# Patient Record
Sex: FEMALE | Race: ASIAN | NOT HISPANIC OR LATINO | URBAN - METROPOLITAN AREA
[De-identification: names, ages, dates, MRNs, and addresses within clinical notes are randomized per-mention and may not be internally consistent; named-entity substitution may affect disease eponyms.]

---

## 2019-08-12 ENCOUNTER — EMERGENCY (EMERGENCY)
Facility: HOSPITAL | Age: 24
LOS: 1 days | Discharge: STILL ADMITTED OR ADMINISTRATIVE | End: 2019-08-12
Admitting: EMERGENCY MEDICINE

## 2019-08-12 ENCOUNTER — OUTPATIENT (OUTPATIENT)
Dept: INPATIENT UNIT | Facility: HOSPITAL | Age: 24
LOS: 1 days | Discharge: HOME | End: 2019-08-12

## 2019-08-12 VITALS
HEART RATE: 66 BPM | SYSTOLIC BLOOD PRESSURE: 114 MMHG | RESPIRATION RATE: 16 BRPM | OXYGEN SATURATION: 100 % | DIASTOLIC BLOOD PRESSURE: 74 MMHG | TEMPERATURE: 98 F

## 2019-08-12 VITALS
HEART RATE: 72 BPM | TEMPERATURE: 98 F | DIASTOLIC BLOOD PRESSURE: 56 MMHG | SYSTOLIC BLOOD PRESSURE: 123 MMHG | RESPIRATION RATE: 18 BRPM

## 2019-08-12 DIAGNOSIS — Z98.891 HISTORY OF UTERINE SCAR FROM PREVIOUS SURGERY: Chronic | ICD-10-CM

## 2019-08-12 DIAGNOSIS — Z3A.00 WEEKS OF GESTATION OF PREGNANCY NOT SPECIFIED: ICD-10-CM

## 2019-08-12 DIAGNOSIS — O26.899 OTHER SPECIFIED PREGNANCY RELATED CONDITIONS, UNSPECIFIED TRIMESTER: ICD-10-CM

## 2019-08-12 LAB
APPEARANCE UR: CLEAR — SIGNIFICANT CHANGE UP
BACTERIA # UR AUTO: NEGATIVE — SIGNIFICANT CHANGE UP
BILIRUB UR-MCNC: NEGATIVE — SIGNIFICANT CHANGE UP
BLOOD UR QL VISUAL: SIGNIFICANT CHANGE UP
COLOR SPEC: YELLOW — SIGNIFICANT CHANGE UP
GLUCOSE UR-MCNC: NEGATIVE — SIGNIFICANT CHANGE UP
HYALINE CASTS # UR AUTO: NEGATIVE — SIGNIFICANT CHANGE UP
KETONES UR-MCNC: NEGATIVE — SIGNIFICANT CHANGE UP
LEUKOCYTE ESTERASE UR-ACNC: NEGATIVE — SIGNIFICANT CHANGE UP
NITRITE UR-MCNC: NEGATIVE — SIGNIFICANT CHANGE UP
PH UR: 6 — SIGNIFICANT CHANGE UP (ref 5–8)
PROT UR-MCNC: NEGATIVE — SIGNIFICANT CHANGE UP
RBC CASTS # UR COMP ASSIST: SIGNIFICANT CHANGE UP (ref 0–?)
SP GR SPEC: 1.02 — SIGNIFICANT CHANGE UP (ref 1–1.04)
SQUAMOUS # UR AUTO: SIGNIFICANT CHANGE UP
UROBILINOGEN FLD QL: NORMAL — SIGNIFICANT CHANGE UP
WBC UR QL: SIGNIFICANT CHANGE UP (ref 0–?)

## 2019-08-12 NOTE — OB PROVIDER TRIAGE NOTE - NSHPPHYSICALEXAM_GEN_ALL_CORE
VSS  Abdomen: Soft, non-tender on palpation  SSE: Cervix appears closed  TVUS: Cervical Length 4.00-4.06cm, no funneling, no dynamic changes  TAUS: Fundal placenta, breech presentation, Mvp: 4.54cm,   [place] In progress  UA Pending

## 2019-08-12 NOTE — OB PROVIDER TRIAGE NOTE - ADDITIONAL INSTRUCTIONS
-Follow-up in private OB's office  -Increase PO hydration  -Eat small meals at a time, and do not lay down for 30 min. after eating.  -Tums recommended.

## 2019-08-12 NOTE — ED ADULT TRIAGE NOTE - CHIEF COMPLAINT QUOTE
Patient about 21 weeks pregnant c/o intermittent cramping. Patient denies any vaginal bleeding. LMP 3/16. L&D called, Patient to go to L&D.

## 2019-08-12 NOTE — OB PROVIDER TRIAGE NOTE - HISTORY OF PRESENT ILLNESS
Pt of [facility] 24 y/[facility]  @21.2 weeks gestation presents to triage with c/o abdominal cramping x 1.5 hrs which has now resolved. Pt c/o epigastric pain 10/10. Pt denies LOF,VB. Pt reports positive fetal movement.  Current Ap course uncomplicated   Medical H/x:Denies  Surgical H/x:  x1  Ob/Gyn H/x: 2015/ failure to dilate/ 5lbs+  Psych H/x: Denies  Meds: Pnv  NKDA

## 2019-08-12 NOTE — OB PROVIDER TRIAGE NOTE - NSOBPROVIDERNOTE_OBGYN_ALL_OB_FT
Pt declines medication for epigastric pain at this time. Pt declines medication for epigastric pain at this time.  Dr Mcneil notified of above findings  Pt cleared for discharge by Dr Mcneil  -Follow-up in private OB's office  -Increase PO hydration  -Eat small meals at a time, and do not lay down for 30 min. after eating.  -Tums recommended.   -PTL warning signs reviewed with patient. Patient verbalized understanding.

## 2019-08-12 NOTE — OB PROVIDER TRIAGE NOTE - PLAN OF CARE
Pt cleared for discharge by Dr Mcneil  -Follow-up in private OB's office  -Increase PO hydration  -PTL warning signs reviewed with patient. Patient verbalized understanding. -Eat small meals at a time, and do not lay down for 30 min. after eating.  -Tums recommended.

## 2019-08-12 NOTE — OB PROVIDER TRIAGE NOTE - NSHPLABSRESULTS_GEN_ALL_CORE
UA Pending Urinalysis Basic - ( 12 Aug 2019 06:30 )    Color: YELLOW / Appearance: CLEAR / S.021 / pH: 6.0  Gluc: NEGATIVE / Ketone: NEGATIVE  / Bili: NEGATIVE / Urobili: NORMAL   Blood: SMALL / Protein: NEGATIVE / Nitrite: NEGATIVE   Leuk Esterase: NEGATIVE / RBC: 0-2 / WBC 0-2   Sq Epi: FEW / Non Sq Epi: x / Bacteria: NEGATIVE

## 2019-12-04 ENCOUNTER — APPOINTMENT (OUTPATIENT)
Dept: OBGYN | Facility: CLINIC | Age: 24
End: 2019-12-04
Payer: MEDICAID

## 2019-12-04 ENCOUNTER — ASOB RESULT (OUTPATIENT)
Age: 24
End: 2019-12-04

## 2019-12-04 ENCOUNTER — APPOINTMENT (OUTPATIENT)
Dept: ANTEPARTUM | Facility: CLINIC | Age: 24
End: 2019-12-04
Payer: MEDICAID

## 2019-12-04 VITALS
DIASTOLIC BLOOD PRESSURE: 84 MMHG | SYSTOLIC BLOOD PRESSURE: 121 MMHG | HEART RATE: 116 BPM | BODY MASS INDEX: 36.32 KG/M2 | WEIGHT: 192.38 LBS | HEIGHT: 61 IN

## 2019-12-04 LAB
BILIRUB UR QL STRIP: NORMAL
GLUCOSE UR-MCNC: NEGATIVE
HCG UR QL: 1 EU/DL
HGB UR QL STRIP.AUTO: NORMAL
KETONES UR-MCNC: NORMAL
LEUKOCYTE ESTERASE UR QL STRIP: NEGATIVE
NITRITE UR QL STRIP: POSITIVE
PH UR STRIP: 5.5
PROT UR STRIP-MCNC: NORMAL

## 2019-12-04 PROCEDURE — 76819 FETAL BIOPHYS PROFIL W/O NST: CPT

## 2019-12-04 PROCEDURE — XXXXX: CPT

## 2019-12-04 PROCEDURE — 0502F SUBSEQUENT PRENATAL CARE: CPT

## 2019-12-04 PROCEDURE — 76811 OB US DETAILED SNGL FETUS: CPT

## 2019-12-06 LAB — BACTERIA UR CULT: NORMAL

## 2019-12-09 ENCOUNTER — NON-APPOINTMENT (OUTPATIENT)
Age: 24
End: 2019-12-09

## 2019-12-09 ENCOUNTER — APPOINTMENT (OUTPATIENT)
Dept: OBGYN | Facility: CLINIC | Age: 24
End: 2019-12-09
Payer: MEDICAID

## 2019-12-09 VITALS
DIASTOLIC BLOOD PRESSURE: 81 MMHG | WEIGHT: 196 LBS | HEIGHT: 61 IN | BODY MASS INDEX: 37 KG/M2 | SYSTOLIC BLOOD PRESSURE: 119 MMHG

## 2019-12-09 PROCEDURE — 0502F SUBSEQUENT PRENATAL CARE: CPT

## 2019-12-12 ENCOUNTER — OUTPATIENT (OUTPATIENT)
Dept: OUTPATIENT SERVICES | Facility: HOSPITAL | Age: 24
LOS: 1 days | End: 2019-12-12

## 2019-12-12 VITALS
HEIGHT: 61 IN | DIASTOLIC BLOOD PRESSURE: 70 MMHG | WEIGHT: 195.11 LBS | HEART RATE: 98 BPM | OXYGEN SATURATION: 98 % | TEMPERATURE: 99 F | SYSTOLIC BLOOD PRESSURE: 110 MMHG | RESPIRATION RATE: 16 BRPM

## 2019-12-12 DIAGNOSIS — O34.211 MATERNAL CARE FOR LOW TRANSVERSE SCAR FROM PREVIOUS CESAREAN DELIVERY: ICD-10-CM

## 2019-12-12 DIAGNOSIS — Z98.891 HISTORY OF UTERINE SCAR FROM PREVIOUS SURGERY: Chronic | ICD-10-CM

## 2019-12-12 DIAGNOSIS — Z98.891 HISTORY OF UTERINE SCAR FROM PREVIOUS SURGERY: ICD-10-CM

## 2019-12-12 DIAGNOSIS — Z98.890 OTHER SPECIFIED POSTPROCEDURAL STATES: ICD-10-CM

## 2019-12-12 LAB
APPEARANCE UR: SIGNIFICANT CHANGE UP
BACTERIA # UR AUTO: HIGH
BILIRUB UR-MCNC: SIGNIFICANT CHANGE UP
BLD GP AB SCN SERPL QL: NEGATIVE — SIGNIFICANT CHANGE UP
BLOOD UR QL VISUAL: NEGATIVE — SIGNIFICANT CHANGE UP
COLOR SPEC: YELLOW — SIGNIFICANT CHANGE UP
GLUCOSE UR-MCNC: NEGATIVE — SIGNIFICANT CHANGE UP
HCT VFR BLD CALC: 36.1 % — SIGNIFICANT CHANGE UP (ref 34.5–45)
HGB BLD-MCNC: 11.4 G/DL — LOW (ref 11.5–15.5)
HYALINE CASTS # UR AUTO: HIGH
KETONES UR-MCNC: SIGNIFICANT CHANGE UP
LEUKOCYTE ESTERASE UR-ACNC: SIGNIFICANT CHANGE UP
MCHC RBC-ENTMCNC: 26.3 PG — LOW (ref 27–34)
MCHC RBC-ENTMCNC: 31.6 % — LOW (ref 32–36)
MCV RBC AUTO: 83.4 FL — SIGNIFICANT CHANGE UP (ref 80–100)
NITRITE UR-MCNC: NEGATIVE — SIGNIFICANT CHANGE UP
NRBC # FLD: 0 K/UL — SIGNIFICANT CHANGE UP (ref 0–0)
PH UR: 6 — SIGNIFICANT CHANGE UP (ref 5–8)
PLATELET # BLD AUTO: 303 K/UL — SIGNIFICANT CHANGE UP (ref 150–400)
PMV BLD: 11.9 FL — SIGNIFICANT CHANGE UP (ref 7–13)
PROT UR-MCNC: 70 — SIGNIFICANT CHANGE UP
RBC # BLD: 4.33 M/UL — SIGNIFICANT CHANGE UP (ref 3.8–5.2)
RBC # FLD: 14.6 % — HIGH (ref 10.3–14.5)
RBC CASTS # UR COMP ASSIST: SIGNIFICANT CHANGE UP (ref 0–?)
RH IG SCN BLD-IMP: POSITIVE — SIGNIFICANT CHANGE UP
SP GR SPEC: 1.03 — SIGNIFICANT CHANGE UP (ref 1–1.04)
SQUAMOUS # UR AUTO: SIGNIFICANT CHANGE UP
UROBILINOGEN FLD QL: SIGNIFICANT CHANGE UP
WBC # BLD: 14.22 K/UL — HIGH (ref 3.8–10.5)
WBC # FLD AUTO: 14.22 K/UL — HIGH (ref 3.8–10.5)
WBC UR QL: >50 — HIGH (ref 0–?)

## 2019-12-12 NOTE — OB PST NOTE - NSHPPHYSICALEXAM_GEN_ALL_CORE

## 2019-12-12 NOTE — OB PST NOTE - PMH
History of  section    No pertinent past medical history Anemia    History of  section    History of postpartum depression

## 2019-12-12 NOTE — OB PST NOTE - HISTORY OF PRESENT ILLNESS
23 y/o female   with hx of  section due to failure to progress in labor and fetal decelerations during labor. Pt now with EDC 19, scheduled for Repeat  section 19. 23 y/o female   with hx of  section due to failure to progress in labor and fetal decelerations.  Pt now with EDC 19, scheduled for Repeat  section 19.

## 2019-12-12 NOTE — OB PST NOTE - NSANTHOSAYNRD_GEN_A_CORE
No. PARI screening performed.  STOP BANG Legend: 0-2 = LOW Risk; 3-4 = INTERMEDIATE Risk; 5-8 = HIGH Risk

## 2019-12-12 NOTE — OB PST NOTE - ASSESSMENT
25 y/o female   with hx of  section due to failure to progress in labor and fetal decelerations during labor. Pt now with EDC 19, scheduled for Repeat  section 19.

## 2019-12-12 NOTE — OB PST NOTE - NSHPREVIEWOFSYSTEMS_GEN_ALL_CORE
General: No fever, chills, sweating, anorexia, weight loss or weight gain. No polyphagia, polyurea, polydypsia, malaise, or fatigue    Skin: No rashes, itching, or dryness. No change in size/color of moles. No tumors, brittle nails, pitted nails, or hair loss    Breast: No tenderness, lumps, or nipple discharge      Ophthalmologic: No diplopia, photophobia, lacrimation, blurred Vision , or eye discharge    ENMT Symptoms: No hearing difficulty, ear pain, tinnitus, or vertigo. No sinus symptoms, nasal congestion, nasal   discharge, or nasal obstruction    Respiratory and Thorax: No wheezing, dyspnea, cough, hemoptysis, or pleuritic chest pPain     Cardiovascular: No chest pain, palpitations, dyspnea on exertion, orthopnea, paroxysmal nocturnal dyspnea,   peripheral edema, or claudication    Gastrointestinal: No nausea, vomiting, diarrhea, constipation, change in bowel habits, flatulence, abdominal pain, or melena    Genitourinary/ Pelvis: No hematuria, renal colic, or flank pain.  No urine discoloration, incontinence, dysuria, or urinary hesitancy. Normal urinary frequency. No nocturia, abnormal vaginal bleeding, vaginal discharge, spotting, pelvic pain, or vaginal leakage    Musculoskeletal: No arthralgia, arthritis, joint swelling, muscle cramping, muscle weakness, neck pain, arm pain, or leg pain    Neurological: No transient paralysis, weakness, paresthesias, or seizures. No syncope, tremors, vertigo, loss of sensation, difficulty walking, loss of consciousness, hemiparesis, confusion, or facial palsy    Psychiatric: No suicidal ideation, depression, anxiety, insomnia, memory loss, paranoia, mood swings, agitation, hallucinations, or hyperactivity    Hematology: No gum bleeding, nose bleeding, or skin lumps    Lymphatic: No enlarged or tender lymph nodes. No extremity swelling    Endocrine: No heat or cold intolerance    Immunologic: No recurrent or persistent infections General: No fever, chills, sweating, anorexia, weight loss or weight gain. No polyphagia, polyurea, polydypsia, malaise, or fatigue    Skin: No rashes, itching, or dryness. No change in size/color of moles. No tumors, brittle nails, pitted nails, or hair loss    Breast: No tenderness, lumps, or nipple discharge      Ophthalmologic: No diplopia, photophobia, lacrimation, blurred Vision , or eye discharge    ENMT Symptoms: No hearing difficulty, ear pain, tinnitus, or vertigo. No sinus symptoms, nasal congestion, nasal   discharge, or nasal obstruction    Respiratory and Thorax: No wheezing, dyspnea, cough, hemoptysis, or pleuritic chest pPain     Cardiovascular: No chest pain, palpitations, dyspnea on exertion, orthopnea, paroxysmal nocturnal dyspnea,   peripheral edema, or claudication    Gastrointestinal:  hx of N/V throughout pregnancy.  Pt reports nausea persists, last episode of vomiting was 1 week ago. Also with constipation, last BM today, denies diarrhea, constipation, change in bowel habits, flatulence, abdominal pain, or melena    Genitourinary/ Pelvis: No hematuria, renal colic, or flank pain.  No urine discoloration, incontinence, dysuria, or urinary hesitancy. Normal urinary frequency. No nocturia, abnormal vaginal bleeding, vaginal discharge, spotting, pelvic pain, or vaginal leakage    Musculoskeletal: No arthralgia, arthritis, joint swelling, muscle cramping, muscle weakness, neck pain, arm pain, or leg pain    Neurological: No transient paralysis, weakness, paresthesias, or seizures. No syncope, tremors, vertigo, loss of sensation, difficulty walking, loss of consciousness, hemiparesis, confusion, or facial palsy    Psychiatric: No suicidal ideation, depression, anxiety, insomnia, memory loss, paranoia, mood swings, agitation, hallucinations, or hyperactivity    Hematology: No gum bleeding, nose bleeding, or skin lumps    Lymphatic: No enlarged or tender lymph nodes. No extremity swelling    Endocrine: No heat or cold intolerance    Immunologic: No recurrent or persistent infections General: No fever, chills, sweating, anorexia, weight loss or weight gain. No polyphagia, polyurea, polydypsia, malaise, or fatigue    Skin: No rashes, itching, or dryness. No change in size/color of moles. No tumors, brittle nails, pitted nails, or hair loss    Breast: No tenderness, lumps, or nipple discharge      Ophthalmologic: No diplopia, photophobia, lacrimation, blurred Vision , or eye discharge    ENMT Symptoms: No hearing difficulty, ear pain, tinnitus, or vertigo. No sinus symptoms, nasal congestion, nasal   discharge, or nasal obstruction    Respiratory and Thorax: No wheezing, dyspnea, cough, hemoptysis, or pleuritic chest pPain     Cardiovascular: No chest pain, palpitations, dyspnea on exertion, orthopnea, paroxysmal nocturnal dyspnea,   peripheral edema, or claudication    Gastrointestinal:  hx of N/V throughout pregnancy.  Pt reports nausea persists, last episode of vomiting was 1 week ago. Also with constipation, last BM today, denies diarrhea, constipation, change in bowel habits, flatulence, abdominal pain, or melena    Genitourinary/ Pelvis: No hematuria, renal colic, or flank pain.  No urine discoloration, incontinence, dysuria, or urinary hesitancy. Normal urinary frequency. No nocturia, abnormal vaginal bleeding, vaginal discharge, spotting, pelvic pain, or vaginal leakage    Musculoskeletal: No arthralgia, arthritis, joint swelling, muscle cramping, muscle weakness, neck pain, arm pain, or leg pain    Neurological: No transient paralysis, weakness, paresthesias, or seizures. No syncope, tremors, vertigo, loss of sensation, difficulty walking, loss of consciousness, hemiparesis, confusion, or facial palsy    Psychiatric:  Pt reports hx of postpartum depression, recent feelings of depression, denies suicidal ideation.  Pt take ambiens for sleep.  Denies  memory loss, paranoia, mood swings, agitation, hallucinations, or hyperactivity    Hematology: No gum bleeding, nose bleeding, or skin lumps    Lymphatic: No enlarged or tender lymph nodes. No extremity swelling    Endocrine: No heat or cold intolerance    Immunologic: No recurrent or persistent infections

## 2019-12-13 LAB — T PALLIDUM AB TITR SER: NEGATIVE — SIGNIFICANT CHANGE UP

## 2019-12-16 ENCOUNTER — EMERGENCY (EMERGENCY)
Facility: HOSPITAL | Age: 24
LOS: 1 days | Discharge: STILL ADMITTED OR ADMINISTRATIVE | End: 2019-12-16
Admitting: EMERGENCY MEDICINE

## 2019-12-16 ENCOUNTER — OUTPATIENT (OUTPATIENT)
Dept: INPATIENT UNIT | Facility: HOSPITAL | Age: 24
LOS: 1 days | Discharge: HOME | End: 2019-12-16
Payer: MEDICAID

## 2019-12-16 VITALS
TEMPERATURE: 98 F | HEART RATE: 88 BPM | RESPIRATION RATE: 18 BRPM | DIASTOLIC BLOOD PRESSURE: 73 MMHG | SYSTOLIC BLOOD PRESSURE: 113 MMHG

## 2019-12-16 VITALS — TEMPERATURE: 98 F | SYSTOLIC BLOOD PRESSURE: 106 MMHG | DIASTOLIC BLOOD PRESSURE: 68 MMHG | HEART RATE: 78 BPM

## 2019-12-16 VITALS
OXYGEN SATURATION: 100 % | HEIGHT: 61 IN | RESPIRATION RATE: 17 BRPM | DIASTOLIC BLOOD PRESSURE: 80 MMHG | TEMPERATURE: 97 F | SYSTOLIC BLOOD PRESSURE: 124 MMHG | HEART RATE: 84 BPM

## 2019-12-16 DIAGNOSIS — Z98.891 HISTORY OF UTERINE SCAR FROM PREVIOUS SURGERY: Chronic | ICD-10-CM

## 2019-12-16 DIAGNOSIS — O26.899 OTHER SPECIFIED PREGNANCY RELATED CONDITIONS, UNSPECIFIED TRIMESTER: ICD-10-CM

## 2019-12-16 DIAGNOSIS — Z3A.00 WEEKS OF GESTATION OF PREGNANCY NOT SPECIFIED: ICD-10-CM

## 2019-12-16 PROBLEM — D64.9 ANEMIA, UNSPECIFIED: Chronic | Status: ACTIVE | Noted: 2019-12-12

## 2019-12-16 LAB
ALBUMIN SERPL ELPH-MCNC: 3.2 G/DL — LOW (ref 3.3–5)
ALP SERPL-CCNC: 154 U/L — HIGH (ref 40–120)
ALT FLD-CCNC: 8 U/L — SIGNIFICANT CHANGE UP (ref 4–33)
AMYLASE P1 CFR SERPL: 62 U/L — SIGNIFICANT CHANGE UP (ref 25–125)
ANION GAP SERPL CALC-SCNC: 14 MMO/L — SIGNIFICANT CHANGE UP (ref 7–14)
AST SERPL-CCNC: 23 U/L — SIGNIFICANT CHANGE UP (ref 4–32)
BILIRUB SERPL-MCNC: 0.3 MG/DL — SIGNIFICANT CHANGE UP (ref 0.2–1.2)
BUN SERPL-MCNC: 9 MG/DL — SIGNIFICANT CHANGE UP (ref 7–23)
CALCIUM SERPL-MCNC: 9.1 MG/DL — SIGNIFICANT CHANGE UP (ref 8.4–10.5)
CHLORIDE SERPL-SCNC: 100 MMOL/L — SIGNIFICANT CHANGE UP (ref 98–107)
CO2 SERPL-SCNC: 23 MMOL/L — SIGNIFICANT CHANGE UP (ref 22–31)
CREAT SERPL-MCNC: 0.52 MG/DL — SIGNIFICANT CHANGE UP (ref 0.5–1.3)
GLUCOSE SERPL-MCNC: 99 MG/DL — SIGNIFICANT CHANGE UP (ref 70–99)
HCT VFR BLD CALC: 33.5 % — LOW (ref 34.5–45)
HGB BLD-MCNC: 10.4 G/DL — LOW (ref 11.5–15.5)
LIDOCAIN IGE QN: 10.9 U/L — SIGNIFICANT CHANGE UP (ref 7–60)
MCHC RBC-ENTMCNC: 25.4 PG — LOW (ref 27–34)
MCHC RBC-ENTMCNC: 31 % — LOW (ref 32–36)
MCV RBC AUTO: 81.7 FL — SIGNIFICANT CHANGE UP (ref 80–100)
NRBC # FLD: 0 K/UL — SIGNIFICANT CHANGE UP (ref 0–0)
PLATELET # BLD AUTO: 326 K/UL — SIGNIFICANT CHANGE UP (ref 150–400)
PMV BLD: 12 FL — SIGNIFICANT CHANGE UP (ref 7–13)
POTASSIUM SERPL-MCNC: 4.2 MMOL/L — SIGNIFICANT CHANGE UP (ref 3.5–5.3)
POTASSIUM SERPL-SCNC: 4.2 MMOL/L — SIGNIFICANT CHANGE UP (ref 3.5–5.3)
PROT SERPL-MCNC: 6.8 G/DL — SIGNIFICANT CHANGE UP (ref 6–8.3)
RBC # BLD: 4.1 M/UL — SIGNIFICANT CHANGE UP (ref 3.8–5.2)
RBC # FLD: 14.6 % — HIGH (ref 10.3–14.5)
SODIUM SERPL-SCNC: 137 MMOL/L — SIGNIFICANT CHANGE UP (ref 135–145)
WBC # BLD: 19.09 K/UL — HIGH (ref 3.8–10.5)
WBC # FLD AUTO: 19.09 K/UL — HIGH (ref 3.8–10.5)

## 2019-12-16 PROCEDURE — 59025 FETAL NON-STRESS TEST: CPT | Mod: 26

## 2019-12-16 PROCEDURE — 99203 OFFICE O/P NEW LOW 30 MIN: CPT

## 2019-12-16 RX ORDER — SIMETHICONE 80 MG/1
80 TABLET, CHEWABLE ORAL ONCE
Refills: 0 | Status: COMPLETED | OUTPATIENT
Start: 2019-12-16 | End: 2019-12-16

## 2019-12-16 RX ADMIN — SIMETHICONE 80 MILLIGRAM(S): 80 TABLET, CHEWABLE ORAL at 06:55

## 2019-12-16 NOTE — OB PROVIDER TRIAGE NOTE - HISTORY OF PRESENT ILLNESS
24 yr old  @ 39.2 wk. presents with epigastric pain an hour ago, with single episode of vomitting, Denies VB/LOF/Ctx. Reports positive fetal movement. Patient reports hx of GERD but has not been able to received any medications, unable to use TUMS over the counter.  PNI: GERD, Scheduled  19  Obhx:  H/O  section   section 8/1/15, FT, 5#19 24 yr old  @ 39.2 wk. Presents with recurrent episode of epigastric pain, that started again an hour ago, with single episode of vomiting Denies VB/LOF/Ctx. Reports positive fetal movement. Patient reports hx of GERD but has not been able to received any medications, unable to use TUMS over the counter.  PNI: GERD, Scheduled  19  Obhx:  H/O  section   section 8/1/15, FT, 5#19

## 2019-12-16 NOTE — OB PROVIDER TRIAGE NOTE - NSHPLABSRESULTS_GEN_ALL_CORE
10.4   19.09 )-----------( 326      ( 16 Dec 2019 06:53 )             33.5     12-16    137  |  100  |  9   ----------------------------<  99  4.2   |  23  |  0.52    Ca    9.1      16 Dec 2019 06:53    TPro  6.8  /  Alb  3.2<L>  /  TBili  0.3  /  DBili  x   /  AST  23  /  ALT  8   /  AlkPhos  154<H>  12-16

## 2019-12-16 NOTE — OB PROVIDER TRIAGE NOTE - NSOBPROVIDERNOTE_OBGYN_ALL_OB_FT
24 yr old  @ 39.2 wk. presents with epigastric pain an hour ago, with single episode of vomitting, Denies VB/LOF/Ctx. Reports positive fetal movement. Patient reports hx of GERD but has not been able to received any medications, unable to use TUMS over the counter.  PNI: GERD, Scheduled  19  Obhx:  H/O  section   section 8/1/15, FT, 5#19    VS: 127/73  TOCo: ctx irritability, in progress  NST: , +accels, -decels, moderate variability, in progress  VE: L/C/P    plan:   CBC  CMP  Lipase/Amylase  Simethicone 24 yr old  @ 39.2 wk. presents with epigastric pain an hour ago, with single episode of vomitting, Denies VB/LOF/Ctx. Reports positive fetal movement. Patient reports hx of GERD but has not been able to received any medications, unable to use TUMS over the counter.  PNI: GERD, Scheduled  19  Obhx:  H/O  section   section 8/1/15, FT, 5#19    VS: 127/73  TOCo: ctx irritability, in progress  NST: , +accels, -decels, moderate variability, in progress  VE: L/C/P    plan:   CBC  CMP  Lipase/Amylase  Simethicone    Patient denies pain, discomfort and symptoms mentioned earlier. Patient symptoms most likely associated with GERD.   - discussed with  patient's CBC values  - cleared for discharge to home   - to keep scheduled c/s date 24 yr old  @ 39.2 wk. presents with epigastric pain an hour ago, with single episode of vomitting, Denies VB/LOF/Ctx. Reports positive fetal movement. Patient reports hx of GERD but has not been able to received any medications, unable to use TUMS over the counter.  PNI: GERD, Scheduled  19  Obhx:  H/O  section   section 8/1/15, FT, 5#19    VS: 127/73  TOCo: ctx irritability, in progress  NST: , +accels, -decels, moderate variability, in progress  VE: L/C/P    plan:   CBC  CMP  Lipase/Amylase  Simethicone    Patient denies pain, discomfort and symptoms mentioned earlier. Patient symptoms most likely associated with GERD.   - discussed with  patient's CBC values, educated patient on returning for symptoms associated with infection, patient verbalized understanding.   - cleared for discharge to home   - to keep scheduled c/s date

## 2019-12-16 NOTE — OB PROVIDER TRIAGE NOTE - FINDINGS/TREATMENT
Patient denies pain, discomfort and symptoms mentioned earlier. Patient symptoms most likely associated with GERD.   - discussed with  patient's CBC values  - cleared for discharge to home   - to keep scheduled c/s date

## 2019-12-16 NOTE — ED ADULT TRIAGE NOTE - CHIEF COMPLAINT QUOTE
Patient about 39 weeks pregnant c/o sharp cramping. Patient reports scheduled  . Patient denies any water breaking. [facility]  L&D called, Patient to go to L&D.

## 2019-12-16 NOTE — OB PROVIDER TRIAGE NOTE - NSHPPHYSICALEXAM_GEN_ALL_CORE
VS: 127/73  TOCo: ctx irritability, in progress  NST: , +accels, -decels, moderate variability, in progresss  VE: L/C/P VS: 127/73  Abd: soft non-tender. McMurphy's sign negative  TOCo: ctx irritability, in progress  NST: , +accels, -decels, moderate variability, in progress  VE: L/C/P

## 2019-12-17 ENCOUNTER — APPOINTMENT (OUTPATIENT)
Dept: OBGYN | Facility: CLINIC | Age: 24
End: 2019-12-17
Payer: MEDICAID

## 2019-12-17 ENCOUNTER — NON-APPOINTMENT (OUTPATIENT)
Age: 24
End: 2019-12-17

## 2019-12-17 VITALS
SYSTOLIC BLOOD PRESSURE: 123 MMHG | WEIGHT: 201 LBS | DIASTOLIC BLOOD PRESSURE: 84 MMHG | HEIGHT: 61 IN | BODY MASS INDEX: 37.95 KG/M2

## 2019-12-17 DIAGNOSIS — L29.8 OTHER PRURITUS: ICD-10-CM

## 2019-12-17 PROCEDURE — 0502F SUBSEQUENT PRENATAL CARE: CPT

## 2019-12-18 ENCOUNTER — TRANSCRIPTION ENCOUNTER (OUTPATIENT)
Age: 24
End: 2019-12-18

## 2019-12-18 RX ORDER — SODIUM CHLORIDE 9 MG/ML
1000 INJECTION, SOLUTION INTRAVENOUS
Refills: 0 | Status: DISCONTINUED | OUTPATIENT
Start: 2019-12-19 | End: 2019-12-20

## 2019-12-19 ENCOUNTER — TRANSCRIPTION ENCOUNTER (OUTPATIENT)
Age: 24
End: 2019-12-19

## 2019-12-19 ENCOUNTER — APPOINTMENT (OUTPATIENT)
Dept: OBGYN | Facility: HOSPITAL | Age: 24
End: 2019-12-19

## 2019-12-19 ENCOUNTER — INPATIENT (INPATIENT)
Facility: HOSPITAL | Age: 24
LOS: 2 days | Discharge: HOME | End: 2019-12-22
Attending: OBSTETRICS & GYNECOLOGY | Admitting: OBSTETRICS & GYNECOLOGY
Payer: MEDICAID

## 2019-12-19 VITALS — TEMPERATURE: 98 F | RESPIRATION RATE: 14 BRPM

## 2019-12-19 DIAGNOSIS — Z98.891 HISTORY OF UTERINE SCAR FROM PREVIOUS SURGERY: Chronic | ICD-10-CM

## 2019-12-19 DIAGNOSIS — O34.21 MATERNAL CARE FOR SCAR FROM PREVIOUS CESAREAN DELIVERY: ICD-10-CM

## 2019-12-19 DIAGNOSIS — Z98.890 OTHER SPECIFIED POSTPROCEDURAL STATES: ICD-10-CM

## 2019-12-19 DIAGNOSIS — O34.211 MATERNAL CARE FOR LOW TRANSVERSE SCAR FROM PREVIOUS CESAREAN DELIVERY: ICD-10-CM

## 2019-12-19 LAB
BLD GP AB SCN SERPL QL: NEGATIVE — SIGNIFICANT CHANGE UP
HCT VFR BLD CALC: 33.7 % — LOW (ref 34.5–45)
HGB BLD-MCNC: 10.5 G/DL — LOW (ref 11.5–15.5)
MCHC RBC-ENTMCNC: 25.3 PG — LOW (ref 27–34)
MCHC RBC-ENTMCNC: 31.2 % — LOW (ref 32–36)
MCV RBC AUTO: 81.2 FL — SIGNIFICANT CHANGE UP (ref 80–100)
NRBC # FLD: 0 K/UL — SIGNIFICANT CHANGE UP (ref 0–0)
PLATELET # BLD AUTO: 372 K/UL — SIGNIFICANT CHANGE UP (ref 150–400)
PMV BLD: 11.5 FL — SIGNIFICANT CHANGE UP (ref 7–13)
RBC # BLD: 4.15 M/UL — SIGNIFICANT CHANGE UP (ref 3.8–5.2)
RBC # FLD: 14.8 % — HIGH (ref 10.3–14.5)
RH IG SCN BLD-IMP: POSITIVE — SIGNIFICANT CHANGE UP
WBC # BLD: 16.7 K/UL — HIGH (ref 3.8–10.5)
WBC # FLD AUTO: 16.7 K/UL — HIGH (ref 3.8–10.5)

## 2019-12-19 PROCEDURE — 59515 CESAREAN DELIVERY: CPT | Mod: U9,UB,GC

## 2019-12-19 PROCEDURE — 59514 CESAREAN DELIVERY ONLY: CPT | Mod: AS,U9

## 2019-12-19 RX ORDER — DIPHENHYDRAMINE HCL 50 MG
25 CAPSULE ORAL EVERY 6 HOURS
Refills: 0 | Status: DISCONTINUED | OUTPATIENT
Start: 2019-12-19 | End: 2019-12-22

## 2019-12-19 RX ORDER — HEPARIN SODIUM 5000 [USP'U]/ML
5000 INJECTION INTRAVENOUS; SUBCUTANEOUS EVERY 12 HOURS
Refills: 0 | Status: DISCONTINUED | OUTPATIENT
Start: 2019-12-19 | End: 2019-12-22

## 2019-12-19 RX ORDER — LANOLIN
1 OINTMENT (GRAM) TOPICAL EVERY 6 HOURS
Refills: 0 | Status: DISCONTINUED | OUTPATIENT
Start: 2019-12-19 | End: 2019-12-22

## 2019-12-19 RX ORDER — KETOROLAC TROMETHAMINE 30 MG/ML
30 SYRINGE (ML) INJECTION EVERY 6 HOURS
Refills: 0 | Status: DISCONTINUED | OUTPATIENT
Start: 2019-12-19 | End: 2019-12-20

## 2019-12-19 RX ORDER — HYDROMORPHONE HYDROCHLORIDE 2 MG/ML
1 INJECTION INTRAMUSCULAR; INTRAVENOUS; SUBCUTANEOUS
Refills: 0 | Status: DISCONTINUED | OUTPATIENT
Start: 2019-12-19 | End: 2019-12-20

## 2019-12-19 RX ORDER — ONDANSETRON 8 MG/1
4 TABLET, FILM COATED ORAL EVERY 6 HOURS
Refills: 0 | Status: DISCONTINUED | OUTPATIENT
Start: 2019-12-19 | End: 2019-12-20

## 2019-12-19 RX ORDER — DIPHENHYDRAMINE HCL 50 MG
25 CAPSULE ORAL EVERY 4 HOURS
Refills: 0 | Status: DISCONTINUED | OUTPATIENT
Start: 2019-12-19 | End: 2019-12-20

## 2019-12-19 RX ORDER — ZOLPIDEM TARTRATE 10 MG/1
1 TABLET ORAL
Qty: 0 | Refills: 0 | DISCHARGE

## 2019-12-19 RX ORDER — CITRIC ACID/SODIUM CITRATE 300-500 MG
30 SOLUTION, ORAL ORAL ONCE
Refills: 0 | Status: COMPLETED | OUTPATIENT
Start: 2019-12-19 | End: 2019-12-19

## 2019-12-19 RX ORDER — MAGNESIUM HYDROXIDE 400 MG/1
30 TABLET, CHEWABLE ORAL
Refills: 0 | Status: DISCONTINUED | OUTPATIENT
Start: 2019-12-19 | End: 2019-12-22

## 2019-12-19 RX ORDER — OXYTOCIN 10 UNIT/ML
41.67 VIAL (ML) INJECTION
Qty: 20 | Refills: 0 | Status: DISCONTINUED | OUTPATIENT
Start: 2019-12-19 | End: 2019-12-20

## 2019-12-19 RX ORDER — SIMETHICONE 80 MG/1
80 TABLET, CHEWABLE ORAL EVERY 4 HOURS
Refills: 0 | Status: DISCONTINUED | OUTPATIENT
Start: 2019-12-19 | End: 2019-12-22

## 2019-12-19 RX ORDER — GLYCERIN ADULT
1 SUPPOSITORY, RECTAL RECTAL AT BEDTIME
Refills: 0 | Status: DISCONTINUED | OUTPATIENT
Start: 2019-12-19 | End: 2019-12-22

## 2019-12-19 RX ORDER — OMEGA-3 ACID ETHYL ESTERS 1 G
1 CAPSULE ORAL
Qty: 0 | Refills: 0 | DISCHARGE

## 2019-12-19 RX ORDER — IBUPROFEN 200 MG
1 TABLET ORAL
Qty: 0 | Refills: 0 | DISCHARGE

## 2019-12-19 RX ORDER — ACETAMINOPHEN 500 MG
2 TABLET ORAL
Qty: 0 | Refills: 0 | DISCHARGE

## 2019-12-19 RX ORDER — TETANUS TOXOID, REDUCED DIPHTHERIA TOXOID AND ACELLULAR PERTUSSIS VACCINE, ADSORBED 5; 2.5; 8; 8; 2.5 [IU]/.5ML; [IU]/.5ML; UG/.5ML; UG/.5ML; UG/.5ML
0.5 SUSPENSION INTRAMUSCULAR ONCE
Refills: 0 | Status: DISCONTINUED | OUTPATIENT
Start: 2019-12-19 | End: 2019-12-22

## 2019-12-19 RX ORDER — SODIUM CHLORIDE 9 MG/ML
1000 INJECTION, SOLUTION INTRAVENOUS ONCE
Refills: 0 | Status: COMPLETED | OUTPATIENT
Start: 2019-12-19 | End: 2019-12-19

## 2019-12-19 RX ORDER — OXYCODONE HYDROCHLORIDE 5 MG/1
5 TABLET ORAL
Refills: 0 | Status: DISCONTINUED | OUTPATIENT
Start: 2019-12-19 | End: 2019-12-20

## 2019-12-19 RX ORDER — NALOXONE HYDROCHLORIDE 4 MG/.1ML
0.1 SPRAY NASAL
Refills: 0 | Status: DISCONTINUED | OUTPATIENT
Start: 2019-12-19 | End: 2019-12-20

## 2019-12-19 RX ORDER — SODIUM CHLORIDE 9 MG/ML
1000 INJECTION, SOLUTION INTRAVENOUS
Refills: 0 | Status: DISCONTINUED | OUTPATIENT
Start: 2019-12-19 | End: 2019-12-20

## 2019-12-19 RX ORDER — ACETAMINOPHEN 500 MG
975 TABLET ORAL
Refills: 0 | Status: DISCONTINUED | OUTPATIENT
Start: 2019-12-19 | End: 2019-12-22

## 2019-12-19 RX ORDER — METOCLOPRAMIDE HCL 10 MG
10 TABLET ORAL ONCE
Refills: 0 | Status: COMPLETED | OUTPATIENT
Start: 2019-12-19 | End: 2019-12-19

## 2019-12-19 RX ORDER — FAMOTIDINE 10 MG/ML
20 INJECTION INTRAVENOUS ONCE
Refills: 0 | Status: COMPLETED | OUTPATIENT
Start: 2019-12-19 | End: 2019-12-19

## 2019-12-19 RX ADMIN — SODIUM CHLORIDE 2000 MILLILITER(S): 9 INJECTION, SOLUTION INTRAVENOUS at 09:03

## 2019-12-19 RX ADMIN — Medication 30 MILLIGRAM(S): at 21:25

## 2019-12-19 RX ADMIN — ONDANSETRON 4 MILLIGRAM(S): 8 TABLET, FILM COATED ORAL at 21:03

## 2019-12-19 RX ADMIN — SODIUM CHLORIDE 75 MILLILITER(S): 9 INJECTION, SOLUTION INTRAVENOUS at 17:14

## 2019-12-19 RX ADMIN — HEPARIN SODIUM 5000 UNIT(S): 5000 INJECTION INTRAVENOUS; SUBCUTANEOUS at 21:03

## 2019-12-19 RX ADMIN — Medication 125 MILLIUNIT(S)/MIN: at 17:15

## 2019-12-19 RX ADMIN — FAMOTIDINE 20 MILLIGRAM(S): 10 INJECTION INTRAVENOUS at 09:04

## 2019-12-19 RX ADMIN — Medication 30 MILLILITER(S): at 09:03

## 2019-12-19 RX ADMIN — Medication 10 MILLIGRAM(S): at 09:03

## 2019-12-19 RX ADMIN — Medication 30 MILLIGRAM(S): at 21:02

## 2019-12-19 NOTE — OB NEONATOLOGY/PEDIATRICIAN DELIVERY SUMMARY - NSPEDSNEONOTESA_OBGYN_ALL_OB_FT
39.5 wk female born to a 23 y/o  mother via repeat CS. Maternal history of previous , hyperemesis during pregnancy, anemia. Maternal blood type A+. Prenatal labs negative, non-reactive and Rubella non immune. GBS unknown.No rupture no labor. Baby was born vigorous   and crying spontaneously. W/D/S/S. APGARS 9/9. Mom is planning on breast/formula feeding,   wants hep B vaccination and wants a circumcision prior to discharge.

## 2019-12-19 NOTE — DISCHARGE NOTE OB - CARE PROVIDER_API CALL
Jess Madison (MD)  Obstetrics and Gynecology  [facility] [facility], [place] [address]  Phone: [phone]  Fax: [phone]  Follow Up Time:

## 2019-12-19 NOTE — OB PROVIDER H&P - HISTORY OF PRESENT ILLNESS
25 Y/O  EDC 19 @39+5w presents for scheduled rpt c/s. Denies abd pain, lof, vb. Endorses +FM. GBS unknown.     PNC: Late transfer from [facility] 2 weeks ago  OBhx:   -Current AP course complicated by N/V during 1st/2nd trimester with no relief from diclegis. Weight loss of 8lbs in pregnancy. Did not require IV/ hospitalization. Otherwise, uncomplicated.   - pltcs NRFHT @1cm 5#19;  2 weeks pp incisional infection resolved with 10 day abx course  GYNhx: Denies fibroids, cysts, abnormal pap smears, STDs  PMHx: Insomnia, Anemia  PSHx:   -2015 C/S  Social: Denies x3  Psych: "pp depression" without treatment  Blood? Yes    NKDA  Meds: Ambien 10mg qhs (last one week ago), PNV, Iron    Abd soft, NT, gravid  Vital Signs Last 24 Hrs  T(C): 36.8 (19 Dec 2019 08:36), Max: 36.8 (19 Dec 2019 07:51)  T(F): 98.2 (19 Dec 2019 08:36), Max: 98.24 (19 Dec 2019 07:51)  HR: 80 (19 Dec 2019 09:43) (75 - 103)  BP: 110/76 (19 Dec 2019 08:36) (110/76 - 110/76)  BP(mean): --  RR: 16 (19 Dec 2019 08:36) (14 - 16)  SpO2: 100% (19 Dec 2019 09:43) (100% - 100%)  EFM baseline change from 150 to 165/mod jose/+ accels/variable decels; baseline returning to 150/no decels with resuscitation   [place] q5min  SVE  deferred  Sono: vtx confirmed, EFW~ 2697g/5#15 (14%) on 19    A/P: 25 Y/O  EDC 19 @39+5w for scheduled rpt c/s    -Admit to L&D  -Cont efm/toco  -Obtain GBS records from [facility]  -Routine labs  -IVF/NPO  -social work consult for hx pp depression  -Consents signed by Dr. Gricelda hawkins, NP

## 2019-12-19 NOTE — OB PROVIDER H&P - ASSESSMENT
23 Y/O  EDC 19 @39+5w presents for scheduled rpt c/s. Denies abd pain, lof, vb. Endorses +FM. GBS unknown.     PNC: Late transfer from [facility] 2 weeks ago  OBhx:   -Current AP course complicated by N/V during 1st/2nd trimester with no relief from diclegis. Weight loss of 8lbs in pregnancy. Did not require IV/ hospitalization. Otherwise, uncomplicated.   - pltcs NRFHT @1cm 5#19;  2 weeks pp incisional infection resolved with 10 day abx course  GYNhx: Denies fibroids, cysts, abnormal pap smears, STDs  PMHx: Insomnia, Anemia  PSHx:   -2015 C/S  Social: Denies x3  Psych: "pp depression" without treatment  Blood? Yes    NKDA  Meds: Ambien 10mg qhs (last one week ago), PNV, Iron    Abd soft, NT, gravid  Vital Signs Last 24 Hrs  T(C): 36.8 (19 Dec 2019 08:36), Max: 36.8 (19 Dec 2019 07:51)  T(F): 98.2 (19 Dec 2019 08:36), Max: 98.24 (19 Dec 2019 07:51)  HR: 80 (19 Dec 2019 09:43) (75 - 103)  BP: 110/76 (19 Dec 2019 08:36) (110/76 - 110/76)  BP(mean): --  RR: 16 (19 Dec 2019 08:36) (14 - 16)  SpO2: 100% (19 Dec 2019 09:43) (100% - 100%)  EFM baseline change from 150 to 165/mod jose/+ accels/variable decels; baseline returning to 150/no decels with resuscitation   [place] q5min  SVE  deferred  Sono: vtx confirmed, EFW~ 2697g/5#15 (14%) on 19    A/P: 23 Y/O  EDC 19 @39+5w for scheduled rpt c/s    -Admit to L&D  -Cont efm/toco  -Obtain GBS records from [facility]  -Routine labs  -IVF/NPO  -social work consult for hx pp depression  -Consents signed by Dr. Gricelda hawkins, NP

## 2019-12-19 NOTE — DISCHARGE NOTE OB - PATIENT PORTAL LINK FT
You can access the FollowMyHealth Patient Portal offered by [facility] by registering at the following website: [email or web address]. By joining [employer]’s FollowMyHealth portal, you will also be able to view your health information using other applications (apps) compatible with our system.

## 2019-12-19 NOTE — DISCHARGE NOTE OB - CARE PLAN
Principal Discharge DX:	 delivery delivered  Goal:	complete recovery  Assessment and plan of treatment:	stable, routine postoperative care

## 2019-12-19 NOTE — DISCHARGE NOTE OB - MEDICATION SUMMARY - MEDICATIONS TO TAKE
I will START or STAY ON the medications listed below when I get home from the hospital:    Motrin 600 mg oral tablet  -- 1 tab(s) by mouth every 6 hours, As Needed  -- Indication: For  delivery    Tylenol 500 mg oral tablet  -- 2 tab(s) by mouth every 6 hours, As Needed  -- Indication: For  delivery    Prenatal  mg oral capsule  -- 1 cap(s) by mouth once a day  -- Indication: For  delivery I will START or STAY ON the medications listed below when I get home from the hospital:    Motrin 600 mg oral tablet  -- 1 tab(s) by mouth every 6 hours, As Needed  -- Indication: For pain, as needed    Tylenol 500 mg oral tablet  -- 2 tab(s) by mouth every 6 hours, As Needed  -- Indication: For pain, as needed    Prenatal  mg oral capsule  -- 1 cap(s) by mouth once a day  -- Indication: For pain, as needed I will START or STAY ON the medications listed below when I get home from the hospital:    Motrin 600 mg oral tablet  -- 1 tab(s) by mouth every 6 hours, As Needed  -- Indication: For pain, as needed    Tylenol 500 mg oral tablet  -- 2 tab(s) by mouth every 6 hours, As Needed  -- Indication: For pain, as needed    oxyCODONE 5 mg oral capsule  -- 1 cap(s) by mouth every 6 hours MDD:4 tabs  -- Caution federal law prohibits the transfer of this drug to any person other  than the person for whom it was prescribed.  It is very important that you take or use this exactly as directed.  Do not skip doses or discontinue unless directed by your doctor.  May cause drowsiness or dizziness.  This prescription cannot be refilled.  Using more of this medication than prescribed may cause serious breathing problems.    -- Indication: For  delivery delivered    Prenatal  mg oral capsule  -- 1 cap(s) by mouth once a day  -- Indication: For pain, as needed

## 2019-12-19 NOTE — OB RN PATIENT PROFILE - PMH
Anemia    History of  section  primary c/section 8/1/15 NRFHT male 5lbs  History of postpartum depression  as per patient she never was offically diagnosed or treated for Postpartum depression but she feels she may have had depression after having her last child.

## 2019-12-19 NOTE — OB RN DELIVERY SUMMARY - NS_SEPSISRSKCALC_OBGYN_ALL_OB_FT
EOS calculated successfully. EOS Risk Factor: 0.5/1000 live births ([facility] national incidence); GA=39w5d; Temp=98.24; ROM=0.033; GBS='Unknown'; Antibiotics='No antibiotics or any antibiotics < 2 hrs prior to birth'

## 2019-12-19 NOTE — DISCHARGE NOTE OB - MATERIALS PROVIDED
Breastfeeding Guide and Packet/Vaccinations/[facility]  Screening Program/Breastfeeding Mother’s Support Group Information/[facility] Hearing Screen Program/  Immunization Record/Breastfeeding Log/Back To Sleep Handout/Shaken Baby Prevention Handout/Discharge Medication Information for Patients and Families Pocket Guide/Guide to Postpartum Care/Birth Certificate Instructions

## 2019-12-19 NOTE — OB PROVIDER H&P - NS_OBGYNHISTORY_OBGYN_ALL_OB_FT
PNC: Late transfer from [facility] 2 weeks ago  OBhx:   -Current AP course complicated by N/V during 1st/2nd trimester with no relief from diclegis. Weight loss of 8lbs in pregnancy. Did not require IV/ hospitalization. Otherwise, uncomplicated.   -2015 pltcs NRFHT @1cm 5#19;  2 weeks pp incisional infection resolved with 10 day abx course  GYNhx: Denies fibroids, cysts, abnormal pap smears, STDs  PMHx: Insomnia, Anemia  PSHx:   -2015 C/S  Social: Denies x3  Psych: "pp depression" without treatment  Blood? Yes

## 2019-12-20 LAB
BASOPHILS # BLD AUTO: 0.06 K/UL — SIGNIFICANT CHANGE UP (ref 0–0.2)
BASOPHILS NFR BLD AUTO: 0.3 % — SIGNIFICANT CHANGE UP (ref 0–2)
EOSINOPHIL # BLD AUTO: 0.08 K/UL — SIGNIFICANT CHANGE UP (ref 0–0.5)
EOSINOPHIL NFR BLD AUTO: 0.4 % — SIGNIFICANT CHANGE UP (ref 0–6)
HCT VFR BLD CALC: 28.5 % — LOW (ref 34.5–45)
HGB BLD-MCNC: 8.9 G/DL — LOW (ref 11.5–15.5)
IMM GRANULOCYTES NFR BLD AUTO: 0.7 % — SIGNIFICANT CHANGE UP (ref 0–1.5)
LYMPHOCYTES # BLD AUTO: 17 % — SIGNIFICANT CHANGE UP (ref 13–44)
LYMPHOCYTES # BLD AUTO: 3.25 K/UL — SIGNIFICANT CHANGE UP (ref 1–3.3)
MCHC RBC-ENTMCNC: 25.7 PG — LOW (ref 27–34)
MCHC RBC-ENTMCNC: 31.2 % — LOW (ref 32–36)
MCV RBC AUTO: 82.4 FL — SIGNIFICANT CHANGE UP (ref 80–100)
MONOCYTES # BLD AUTO: 1.13 K/UL — HIGH (ref 0–0.9)
MONOCYTES NFR BLD AUTO: 5.9 % — SIGNIFICANT CHANGE UP (ref 2–14)
NEUTROPHILS # BLD AUTO: 14.43 K/UL — HIGH (ref 1.8–7.4)
NEUTROPHILS NFR BLD AUTO: 75.7 % — SIGNIFICANT CHANGE UP (ref 43–77)
NRBC # FLD: 0 K/UL — SIGNIFICANT CHANGE UP (ref 0–0)
PLATELET # BLD AUTO: 291 K/UL — SIGNIFICANT CHANGE UP (ref 150–400)
PMV BLD: 11.5 FL — SIGNIFICANT CHANGE UP (ref 7–13)
RBC # BLD: 3.46 M/UL — LOW (ref 3.8–5.2)
RBC # FLD: 14.6 % — HIGH (ref 10.3–14.5)
WBC # BLD: 19.08 K/UL — HIGH (ref 3.8–10.5)
WBC # FLD AUTO: 19.08 K/UL — HIGH (ref 3.8–10.5)

## 2019-12-20 RX ORDER — IBUPROFEN 200 MG
600 TABLET ORAL EVERY 6 HOURS
Refills: 0 | Status: DISCONTINUED | OUTPATIENT
Start: 2019-12-20 | End: 2019-12-22

## 2019-12-20 RX ORDER — OXYCODONE HYDROCHLORIDE 5 MG/1
5 TABLET ORAL
Refills: 0 | Status: DISCONTINUED | OUTPATIENT
Start: 2019-12-20 | End: 2019-12-22

## 2019-12-20 RX ORDER — FERROUS SULFATE 325(65) MG
325 TABLET ORAL THREE TIMES A DAY
Refills: 0 | Status: DISCONTINUED | OUTPATIENT
Start: 2019-12-20 | End: 2019-12-22

## 2019-12-20 RX ORDER — OXYCODONE HYDROCHLORIDE 5 MG/1
5 TABLET ORAL ONCE
Refills: 0 | Status: DISCONTINUED | OUTPATIENT
Start: 2019-12-20 | End: 2019-12-22

## 2019-12-20 RX ORDER — OXYCODONE HYDROCHLORIDE 5 MG/1
5 TABLET ORAL
Refills: 0 | Status: COMPLETED | OUTPATIENT
Start: 2019-12-20 | End: 2019-12-27

## 2019-12-20 RX ORDER — ASCORBIC ACID 60 MG
500 TABLET,CHEWABLE ORAL DAILY
Refills: 0 | Status: DISCONTINUED | OUTPATIENT
Start: 2019-12-20 | End: 2019-12-22

## 2019-12-20 RX ADMIN — OXYCODONE HYDROCHLORIDE 5 MILLIGRAM(S): 5 TABLET ORAL at 15:00

## 2019-12-20 RX ADMIN — Medication 600 MILLIGRAM(S): at 11:02

## 2019-12-20 RX ADMIN — Medication 600 MILLIGRAM(S): at 11:45

## 2019-12-20 RX ADMIN — HEPARIN SODIUM 5000 UNIT(S): 5000 INJECTION INTRAVENOUS; SUBCUTANEOUS at 21:58

## 2019-12-20 RX ADMIN — Medication 30 MILLIGRAM(S): at 03:14

## 2019-12-20 RX ADMIN — Medication 975 MILLIGRAM(S): at 14:11

## 2019-12-20 RX ADMIN — OXYCODONE HYDROCHLORIDE 5 MILLIGRAM(S): 5 TABLET ORAL at 22:33

## 2019-12-20 RX ADMIN — OXYCODONE HYDROCHLORIDE 5 MILLIGRAM(S): 5 TABLET ORAL at 14:11

## 2019-12-20 RX ADMIN — HEPARIN SODIUM 5000 UNIT(S): 5000 INJECTION INTRAVENOUS; SUBCUTANEOUS at 10:59

## 2019-12-20 RX ADMIN — Medication 975 MILLIGRAM(S): at 21:04

## 2019-12-20 RX ADMIN — Medication 600 MILLIGRAM(S): at 18:00

## 2019-12-20 RX ADMIN — Medication 975 MILLIGRAM(S): at 21:58

## 2019-12-20 RX ADMIN — Medication 600 MILLIGRAM(S): at 17:12

## 2019-12-20 RX ADMIN — SIMETHICONE 80 MILLIGRAM(S): 80 TABLET, CHEWABLE ORAL at 21:04

## 2019-12-20 RX ADMIN — OXYCODONE HYDROCHLORIDE 5 MILLIGRAM(S): 5 TABLET ORAL at 21:58

## 2019-12-20 RX ADMIN — Medication 30 MILLIGRAM(S): at 03:45

## 2019-12-20 RX ADMIN — Medication 975 MILLIGRAM(S): at 15:00

## 2019-12-20 NOTE — PROGRESS NOTE ADULT - PROBLEM SELECTOR PLAN 1
- Continue regular diet.  - Increase ambulation.  - Continue motrin, tylenol, oxycodone PRN for pain control.     Marcia Coreas PGY-1  #[id number]

## 2019-12-20 NOTE — PROGRESS NOTE ADULT - SUBJECTIVE AND OBJECTIVE BOX
OB Progress Note:  Delivery, POD#1    S: 25yo POD#1 s/p LTCS. Her pain is well controlled. She is tolerating a regular diet. Not yet passing flatus. Denies N/V. Denies CP/SOB/lightheadedness/dizziness. She is ambulating without difficulty. Voiding spontaneously.     O:   Vital Signs Last 24 Hrs  T(C): 36.6 (20 Dec 2019 06:10), Max: 36.8 (19 Dec 2019 08:36)  HR: 78 (20 Dec 2019 06:10) (0 - 103)  BP: 103/60 (20 Dec 2019 06:10) (103/60 - 127/64)  BP(mean): 70 (19 Dec 2019 18:00) (63 - 79)  RR: 18 (20 Dec 2019 06:10) (16 - 22)  SpO2: 100% (20 Dec 2019 06:10) (98% - 100%)    Physical exam:  General: NAD  Abdomen: Mildly distended, appropriately tender, incision c/d/i.  Extremities: No erythema, no pitting edema    Labs:  Blood type: A Positive  Rubella IgG: RPR: Negative                          8.9<L>   19.08<H> >-----------< 291    (  @ 06:42 )             28.5<L>                        10.5<L>   16.70<H> >-----------< 372    (  @ 08:15 )             33.7<L>

## 2019-12-20 NOTE — BRIEF OPERATIVE NOTE - OPERATION/FINDINGS
Viable female infant, apgar 9/9, wgt 3065gms @ 1419  cephalic presentation, clear copious fluid  placenta immediately presented @ hysterotomy  hysterotomy closed in 2 layers  left sided rectus bleed w/ dissection of rectus from fascia, area oversewn w/ good hemostasis  fibrillar placed over rectus layer prior to closure  grossly nl uterus, tubes & ovaries  EBL: 800  QBL: 409  UOP: 100  IVF: 2500  Dictation Number: [id number]

## 2019-12-20 NOTE — OB PROVIDER DELIVERY SUMMARY - NSPROVIDERDELIVERYNOTE_OBGYN_ALL_OB_FT
Viable female infant, apgar 9/9, wgt 3065gms @ 1419  cephalic presentation, clear copious fluid  placenta immediately presented @ hysterotomy  hysterotomy closed in 2 layers  left sided rectus bleed w/ dissection of rectus from fascia, area oversewn w/ good hemostasis  fibrillar placed over rectus layer prior to closure  grossly nl uterus, tubes & ovaries  EBL: 800  QBL: 409  UOP: 100  IVF: 2500  Dictation Number: [id number]

## 2019-12-21 RX ADMIN — OXYCODONE HYDROCHLORIDE 5 MILLIGRAM(S): 5 TABLET ORAL at 09:35

## 2019-12-21 RX ADMIN — OXYCODONE HYDROCHLORIDE 5 MILLIGRAM(S): 5 TABLET ORAL at 13:58

## 2019-12-21 RX ADMIN — OXYCODONE HYDROCHLORIDE 5 MILLIGRAM(S): 5 TABLET ORAL at 14:30

## 2019-12-21 RX ADMIN — Medication 500 MILLIGRAM(S): at 13:58

## 2019-12-21 RX ADMIN — Medication 600 MILLIGRAM(S): at 13:58

## 2019-12-21 RX ADMIN — HEPARIN SODIUM 5000 UNIT(S): 5000 INJECTION INTRAVENOUS; SUBCUTANEOUS at 09:41

## 2019-12-21 RX ADMIN — Medication 975 MILLIGRAM(S): at 09:35

## 2019-12-21 RX ADMIN — Medication 600 MILLIGRAM(S): at 14:30

## 2019-12-21 RX ADMIN — Medication 325 MILLIGRAM(S): at 21:22

## 2019-12-21 RX ADMIN — Medication 600 MILLIGRAM(S): at 02:30

## 2019-12-21 RX ADMIN — Medication 600 MILLIGRAM(S): at 01:50

## 2019-12-21 RX ADMIN — Medication 975 MILLIGRAM(S): at 17:45

## 2019-12-21 RX ADMIN — Medication 975 MILLIGRAM(S): at 03:20

## 2019-12-21 RX ADMIN — Medication 975 MILLIGRAM(S): at 04:00

## 2019-12-21 RX ADMIN — Medication 975 MILLIGRAM(S): at 17:08

## 2019-12-21 RX ADMIN — Medication 975 MILLIGRAM(S): at 22:19

## 2019-12-21 RX ADMIN — Medication 975 MILLIGRAM(S): at 21:22

## 2019-12-21 RX ADMIN — Medication 975 MILLIGRAM(S): at 10:15

## 2019-12-21 RX ADMIN — Medication 325 MILLIGRAM(S): at 13:58

## 2019-12-21 RX ADMIN — OXYCODONE HYDROCHLORIDE 5 MILLIGRAM(S): 5 TABLET ORAL at 10:15

## 2019-12-21 NOTE — PROGRESS NOTE ADULT - PROBLEM SELECTOR PLAN 1
- Continue regular diet.  - Increase ambulation.  - Continue motrin, tylenol, oxycodone PRN for pain control.     Marcia Coreas PGY-1  #[id number]

## 2019-12-21 NOTE — PROGRESS NOTE ADULT - ATTENDING COMMENTS
saw and examined patient on 12/21 and was doing well  Discharged instructions reviewed  requested to be discharged on oxycodone. Told patient will only give a few tablets

## 2019-12-22 ENCOUNTER — MEDICATION RENEWAL (OUTPATIENT)
Age: 24
End: 2019-12-22

## 2019-12-22 VITALS
TEMPERATURE: 98 F | SYSTOLIC BLOOD PRESSURE: 112 MMHG | DIASTOLIC BLOOD PRESSURE: 58 MMHG | OXYGEN SATURATION: 100 % | HEART RATE: 66 BPM | RESPIRATION RATE: 18 BRPM

## 2019-12-22 LAB
ALT SERPL-CCNC: 15 U/L
AST SERPL-CCNC: 22 U/L
BILE AC SER-MCNC: 5.8 UMOL/L
BILEACIDS T: 5.4 UMOL/L
CHENDEOXYCHOLIC ACID: 3 UMOL/L
CHOLIC ACID: 2.1 UMOL/L
DEOXYCHOLIC ACID: 0.3 UMOL/L
LDH SERPL-CCNC: 258 U/L
URSODEOXYCH: <0.1 UMOL/L

## 2019-12-22 RX ORDER — OXYCODONE HYDROCHLORIDE 5 MG/1
1 TABLET ORAL
Qty: 10 | Refills: 0
Start: 2019-12-22 | End: 2019-12-24

## 2019-12-22 RX ADMIN — Medication 600 MILLIGRAM(S): at 00:18

## 2019-12-22 RX ADMIN — Medication 0.5 MILLILITER(S): at 11:12

## 2019-12-22 RX ADMIN — Medication 975 MILLIGRAM(S): at 05:04

## 2019-12-22 RX ADMIN — Medication 325 MILLIGRAM(S): at 06:11

## 2019-12-22 RX ADMIN — Medication 600 MILLIGRAM(S): at 11:35

## 2019-12-22 RX ADMIN — Medication 600 MILLIGRAM(S): at 06:56

## 2019-12-22 RX ADMIN — Medication 600 MILLIGRAM(S): at 01:00

## 2019-12-22 RX ADMIN — HEPARIN SODIUM 5000 UNIT(S): 5000 INJECTION INTRAVENOUS; SUBCUTANEOUS at 06:11

## 2019-12-22 RX ADMIN — Medication 600 MILLIGRAM(S): at 06:10

## 2019-12-22 RX ADMIN — Medication 975 MILLIGRAM(S): at 04:11

## 2019-12-22 NOTE — PROGRESS NOTE ADULT - SUBJECTIVE AND OBJECTIVE BOX
SUBJECTIVE:    Pain: well controlled  Complaints: none    MILESTONES:    Alert and oriented x 3  [ x ]  Out of bed/ ambulating. [ x ]  Flatus: [ x ]  Postive [  ] Negative   Bowel movement  [ x ] Positive [  ] Negative   Voiding [ x ] Due to void [  ]   Diet: Regular [ x ]  Clears [  ]  NPO [  ]  Infant feeding:  Breast [ x ]   Bottle [  ]  Both [  ]  Feeding related inssues and/or concerns:none      OBJECTIVE:  T(C): 36.8 (19 @ 06:13), Max: 36.8 (19 @ 14:02)  HR: 66 (19 @ 06:13) (66 - 66)  BP: 112/58 (19 @ 06:13) (103/54 - 112/58)  RR: 18 (19 @ 06:13) (16 - 18)  SpO2: 100% (19 @ 06:13) (99% - 100%)  Wt(kg): --      MEDICATIONS  (STANDING):  acetaminophen   Tablet .. 975 milliGRAM(s) Oral <User Schedule>  ascorbic acid 500 milliGRAM(s) Oral daily  diphtheria/tetanus/pertussis (acellular) Vaccine (ADAcel) 0.5 milliLiter(s) IntraMuscular once  ferrous    sulfate 325 milliGRAM(s) Oral three times a day  heparin  Injectable 5000 Unit(s) SubCutaneous every 12 hours  ibuprofen  Tablet. 600 milliGRAM(s) Oral every 6 hours  ibuprofen  Tablet. 600 milliGRAM(s) Oral every 6 hours    MEDICATIONS  (PRN):  diphenhydrAMINE 25 milliGRAM(s) Oral every 6 hours PRN Itching  glycerin Suppository - Adult 1 Suppository(s) Rectal at bedtime PRN Constipation  lanolin Ointment 1 Application(s) Topical every 6 hours PRN Sore Nipples  magnesium hydroxide Suspension 30 milliLiter(s) Oral two times a day PRN Constipation  oxyCODONE    IR 5 milliGRAM(s) Oral once PRN Moderate to Severe Pain (4-10)  oxyCODONE    IR 5 milliGRAM(s) Oral every 3 hours PRN Moderate to Severe Pain (4-10)  simethicone 80 milliGRAM(s) Chew every 4 hours PRN Gas      ASSESSMENT:  24y  y/o G2   P 2   PO Day# 3       Delivery: Primary [  ]    Repeat [x  ]                                       Indication of procedure: previous c/s  Condition: Stable  Past Medical History significant for: HPI: none  Current Issues: none  Heart:        RRR                      Lungs: clear  Breasts:  Soft [x  ]   Engorged [  ]  Abdomen: Soft [ x ] , distended [  ] nontender [x  ]   Bowel sounds :  Present [x  ]  Absent [  ]   Fundus firm [x  ]  Boggy [  ]  Abdominal incision: Clean, dry and intact [x  ]  Staples [  ] Steri Strips [ x ] Dermabond [  ] Sutures   Patient wearing abdominal binder for support.  Vaginal: Lochia:  Heavy [  ]  Moderate [  ]   Scant [ x ]  Extremities: Edema [0  ] negative Lexi's Sign [ x ] Nontender Alphonse  [x  ] Positive pedal pulses [x  ]  Other relevant physical exam findings: none      PLAN:  Plan: Increase ambulation, analgesia PRN and pain medication protocol standing oxycodone, ibuprofen and acetaminophen.  Diet: Regular diet  Continue routine post-operative and postpartum care.   Discharge Planning [x  ]

## 2019-12-27 ENCOUNTER — OTHER (OUTPATIENT)
Age: 24
End: 2019-12-27

## 2019-12-27 ENCOUNTER — EMERGENCY (EMERGENCY)
Facility: HOSPITAL | Age: 24
LOS: 1 days | Discharge: HOME | End: 2019-12-27
Attending: EMERGENCY MEDICINE | Admitting: EMERGENCY MEDICINE
Payer: MEDICAID

## 2019-12-27 VITALS
HEART RATE: 73 BPM | OXYGEN SATURATION: 100 % | SYSTOLIC BLOOD PRESSURE: 120 MMHG | HEIGHT: 61 IN | RESPIRATION RATE: 18 BRPM | DIASTOLIC BLOOD PRESSURE: 79 MMHG | TEMPERATURE: 98 F

## 2019-12-27 DIAGNOSIS — Z98.891 HISTORY OF UTERINE SCAR FROM PREVIOUS SURGERY: Chronic | ICD-10-CM

## 2019-12-27 PROCEDURE — 99284 EMERGENCY DEPT VISIT MOD MDM: CPT

## 2019-12-27 NOTE — ED ADULT TRIAGE NOTE - CHIEF COMPLAINT QUOTE
Pt s/p  delivery at [facility] on . For the past 3 days pt has been experiencing dizziness, headache and abdominal cramping. Pt denies surgical site complications.

## 2019-12-28 VITALS
TEMPERATURE: 100 F | HEART RATE: 68 BPM | OXYGEN SATURATION: 100 % | RESPIRATION RATE: 16 BRPM | DIASTOLIC BLOOD PRESSURE: 65 MMHG | SYSTOLIC BLOOD PRESSURE: 120 MMHG

## 2019-12-28 PROBLEM — Z98.891 HISTORY OF UTERINE SCAR FROM PREVIOUS SURGERY: Chronic | Status: ACTIVE | Noted: 2019-12-12

## 2019-12-28 PROBLEM — Z87.59 PERSONAL HISTORY OF OTHER COMPLICATIONS OF PREGNANCY, CHILDBIRTH AND THE PUERPERIUM: Chronic | Status: ACTIVE | Noted: 2019-12-12

## 2019-12-28 LAB
ALBUMIN SERPL ELPH-MCNC: 3.4 G/DL — SIGNIFICANT CHANGE UP (ref 3.3–5)
ALP SERPL-CCNC: 93 U/L — SIGNIFICANT CHANGE UP (ref 40–120)
ALT FLD-CCNC: 6 U/L — SIGNIFICANT CHANGE UP (ref 4–33)
ANION GAP SERPL CALC-SCNC: 15 MMO/L — HIGH (ref 7–14)
APTT BLD: 32.7 SEC — SIGNIFICANT CHANGE UP (ref 27.5–36.3)
AST SERPL-CCNC: 13 U/L — SIGNIFICANT CHANGE UP (ref 4–32)
BASOPHILS # BLD AUTO: 0.08 K/UL — SIGNIFICANT CHANGE UP (ref 0–0.2)
BASOPHILS NFR BLD AUTO: 0.7 % — SIGNIFICANT CHANGE UP (ref 0–2)
BILIRUB SERPL-MCNC: < 0.2 MG/DL — LOW (ref 0.2–1.2)
BUN SERPL-MCNC: 14 MG/DL — SIGNIFICANT CHANGE UP (ref 7–23)
CALCIUM SERPL-MCNC: 9.1 MG/DL — SIGNIFICANT CHANGE UP (ref 8.4–10.5)
CHLORIDE SERPL-SCNC: 101 MMOL/L — SIGNIFICANT CHANGE UP (ref 98–107)
CO2 SERPL-SCNC: 24 MMOL/L — SIGNIFICANT CHANGE UP (ref 22–31)
CREAT SERPL-MCNC: 0.81 MG/DL — SIGNIFICANT CHANGE UP (ref 0.5–1.3)
EOSINOPHIL # BLD AUTO: 0.71 K/UL — HIGH (ref 0–0.5)
EOSINOPHIL NFR BLD AUTO: 5.8 % — SIGNIFICANT CHANGE UP (ref 0–6)
GLUCOSE SERPL-MCNC: 84 MG/DL — SIGNIFICANT CHANGE UP (ref 70–99)
HCT VFR BLD CALC: 32 % — LOW (ref 34.5–45)
HGB BLD-MCNC: 9.8 G/DL — LOW (ref 11.5–15.5)
IMM GRANULOCYTES NFR BLD AUTO: 0.6 % — SIGNIFICANT CHANGE UP (ref 0–1.5)
INR BLD: 1.1 — SIGNIFICANT CHANGE UP (ref 0.88–1.17)
LYMPHOCYTES # BLD AUTO: 36.7 % — SIGNIFICANT CHANGE UP (ref 13–44)
LYMPHOCYTES # BLD AUTO: 4.47 K/UL — HIGH (ref 1–3.3)
MCHC RBC-ENTMCNC: 25.3 PG — LOW (ref 27–34)
MCHC RBC-ENTMCNC: 30.6 % — LOW (ref 32–36)
MCV RBC AUTO: 82.7 FL — SIGNIFICANT CHANGE UP (ref 80–100)
MONOCYTES # BLD AUTO: 0.73 K/UL — SIGNIFICANT CHANGE UP (ref 0–0.9)
MONOCYTES NFR BLD AUTO: 6 % — SIGNIFICANT CHANGE UP (ref 2–14)
NEUTROPHILS # BLD AUTO: 6.12 K/UL — SIGNIFICANT CHANGE UP (ref 1.8–7.4)
NEUTROPHILS NFR BLD AUTO: 50.2 % — SIGNIFICANT CHANGE UP (ref 43–77)
NRBC # FLD: 0 K/UL — SIGNIFICANT CHANGE UP (ref 0–0)
PLATELET # BLD AUTO: 484 K/UL — HIGH (ref 150–400)
PMV BLD: 10.3 FL — SIGNIFICANT CHANGE UP (ref 7–13)
POTASSIUM SERPL-MCNC: 3.5 MMOL/L — SIGNIFICANT CHANGE UP (ref 3.5–5.3)
POTASSIUM SERPL-SCNC: 3.5 MMOL/L — SIGNIFICANT CHANGE UP (ref 3.5–5.3)
PROT SERPL-MCNC: 7.4 G/DL — SIGNIFICANT CHANGE UP (ref 6–8.3)
PROTHROM AB SERPL-ACNC: 12.3 SEC — SIGNIFICANT CHANGE UP (ref 9.8–13.1)
RBC # BLD: 3.87 M/UL — SIGNIFICANT CHANGE UP (ref 3.8–5.2)
RBC # FLD: 15.7 % — HIGH (ref 10.3–14.5)
SODIUM SERPL-SCNC: 140 MMOL/L — SIGNIFICANT CHANGE UP (ref 135–145)
WBC # BLD: 12.18 K/UL — HIGH (ref 3.8–10.5)
WBC # FLD AUTO: 12.18 K/UL — HIGH (ref 3.8–10.5)

## 2019-12-28 RX ORDER — KETOROLAC TROMETHAMINE 30 MG/ML
15 SYRINGE (ML) INJECTION ONCE
Refills: 0 | Status: DISCONTINUED | OUTPATIENT
Start: 2019-12-28 | End: 2019-12-28

## 2019-12-28 RX ORDER — METOCLOPRAMIDE HCL 10 MG
10 TABLET ORAL ONCE
Refills: 0 | Status: COMPLETED | OUTPATIENT
Start: 2019-12-28 | End: 2019-12-28

## 2019-12-28 RX ORDER — SODIUM CHLORIDE 9 MG/ML
1000 INJECTION INTRAMUSCULAR; INTRAVENOUS; SUBCUTANEOUS ONCE
Refills: 0 | Status: COMPLETED | OUTPATIENT
Start: 2019-12-28 | End: 2019-12-28

## 2019-12-28 RX ADMIN — SODIUM CHLORIDE 1000 MILLILITER(S): 9 INJECTION INTRAMUSCULAR; INTRAVENOUS; SUBCUTANEOUS at 03:16

## 2019-12-28 RX ADMIN — Medication 10 MILLIGRAM(S): at 03:16

## 2019-12-28 RX ADMIN — Medication 15 MILLIGRAM(S): at 03:16

## 2019-12-28 NOTE — ED PROVIDER NOTE - PATIENT PORTAL LINK FT
You can access the FollowMyHealth Patient Portal offered by [facility] by registering at the following website: [email or web address]. By joining [facility]’s FollowMyHealth portal, you will also be able to view your health information using other applications (apps) compatible with our system.

## 2019-12-28 NOTE — ED PROVIDER NOTE - PROGRESS NOTE DETAILS
Will c/s OB - Dr. Archer patient. The pt is clinically sober, AA&Ox3, free from distracting injury.  Throughout our interactions in the ED today, the pt has demonstrated concrete thinking/reasoning, has maintained an orderly/reasonable conversation, appears to have intact insight/judgment/reason and therefore in our opinion has capacity to make decisions.    Given the patient's presentation, we communicated our concern for death and permanent disability in laymans terms.    The pt verbalized an understanding of our worries. We’ve told the patient that the ED evaluation is incomplete & many troublesome conditions haven’t been r/o.  We have discussed the need for further ED w/u so we can get more information about your headache.  We have discussed the range of possible dx, potential testing & tx options.  We’ve made  numerous efforts to prevent the pt from leaving AMA.  Our discussions included the potential outcomes of leaving AMA, including worsening of their condition, becoming permanently disabled/in pain/critically ill, or death.  Despite these efforts, we were unable to convince the pt to stay.  The pt is refusing any  further care and is leaving against medical advice. We have attempted to offer tx/rx/guidance for any dangerous conditions which are most likely and/or dangerous.  We have answered all questions and have implored the pt to return ASAP to complete the work up.  A staff member witnessed the patient consenting to AMA.

## 2019-12-28 NOTE — ED ADULT NURSE NOTE - OBJECTIVE STATEMENT
received patient in room 8 with  at bedside. pt s/p scheduled   3 days ago.  states felt fine until yesterday when she said she may have overexerted herself cleaning the house for company.  c/o pain at surgical site, tightness, slight swelling.  Steri-strips intact. no redness or increased warmth.  c/o dizziness.  reports had an infection after 1st  and feels symptoms are similar.  20 gauge inserted left ac. bloods drawn. waiting to be seen by doctor.

## 2019-12-28 NOTE — ED PROVIDER NOTE - OBJECTIVE STATEMENT
23yo F  s/p  () presents with headache, dizziness. Had similar symptoms with last  , when she had infection of  site and needed ?washout of the area and was admitted for 2 days. Today, has generalized headahce and dizziness. Denies n/v, neurologic sxs. Normal vaginal discharge appropriate for after . denies f/c.

## 2019-12-28 NOTE — ED PROVIDER NOTE - CLINICAL SUMMARY MEDICAL DECISION MAKING FREE TEXT BOX
headache, dizziness s/p . considered endometritis, unlikely given no fevers. well healing scar, unlikely cellulitis from surgical site. Normal vaginal discharge. Will consider LP / anesthesia related HA, will hydrate, medications, reassess for post LP HA vs primary HA

## 2019-12-28 NOTE — ED ADULT NURSE NOTE - CHIEF COMPLAINT QUOTE
Pt s/p  delivery at [facility] on . For the past 3 days pt has been experiencing dizziness, headache and abdominal cramping. Pt denies surgical site complications.

## 2019-12-28 NOTE — ED PROVIDER NOTE - ATTENDING CONTRIBUTION TO CARE
I have personally performed a face to face bedside history and physical examination of this patient. I have discussed the history, examination, review of systems, assessment and plan of management with the resident. I have reviewed the electronic medical record and amended it to reflect my history, review of systems, physical exam, assessment and plan.    23yo F  s/p  () presents with headache, dizziness.  Described as non-acute, non-maximal in onset, similar to previous headache when she had  site infection years ago.  Denies fever, neck stiffness, photophobia.  Had spinal anesthesia during procedure.  VSS AF.  Exam non-toxic appearing, AAox3, no focal neuro deficits, neck supple, no gait deficit.  Surgical site without erythema or purulence.  Suspect primary headache syndrome.  Low c/f overall for SAH.  Venous sinus thrombosis possible, but low on ddx.  No c/f menigitis given exam.  Plan for supportive care, labs, reassessment.  If patient without improvement will consider imaging.  Dispo pending.

## 2019-12-28 NOTE — ED PROVIDER NOTE - PHYSICAL EXAMINATION
Gen: Well appearing, NAD  Head: NCAT  HEENT: PERRL, MMM, normal conjunctiva, anicteric, neck supple  Lung: CTAB, no adventitious sounds  CV: RRR, no murmurs, rubs or gallops  Abd: soft, NTND, no rebound or guarding, no CVAT; well healed incision without exudates or erythema  MSK: No edema, no visible deformities  Neuro: No focal neurologic deficits. CN II-XII grossly intact. 5/5 strength and normal sensation in all extremities.  Skin: Warm and dry, no evidence of rash  Psych: normal mood and affect

## 2019-12-28 NOTE — ED PROVIDER NOTE - NSFOLLOWUPINSTRUCTIONS_ED_ALL_ED_FT
You were seen in the ER for headache.  You decided to leave against medical advice.  Return to ER for life threatening emergencies.  Follow up with your doctor.

## 2019-12-28 NOTE — ED PROVIDER NOTE - NS ED ROS FT
ROS: denies weakness, fevers/chills, nausea/vomiting, chest pain, SOB, diaphoresis, abdominal pain, diarrhea, joint pain, neuro deficits, dysuria/hematuria, rash    +HA, dizziness, abd cramping

## 2020-02-04 ENCOUNTER — APPOINTMENT (OUTPATIENT)
Dept: OBGYN | Facility: CLINIC | Age: 25
End: 2020-02-04

## 2020-05-15 NOTE — OB PST NOTE - PROBLEM SELECTOR PLAN 1
Called patient to verify if she is able to make fetal echo appointment today.  Telephone numbers on file are for patient's parents.  Patient's mother provided patient's telephone number.  Telephone call unable to go through.  Unable to leave voicemail and patient portal not active.     23 y/o female   with hx of  section due to failure to progress in labor and fetal decelerations during labor. Pt now with EDC 19, scheduled for Repeat  section 19. 23 y/o female   with hx of  section due to failure to progress in labor and fetal decelerations. Pt now with EDC 19, scheduled for Repeat  section 19.    CBC RPR T&S UA    Preop instructions and antibacterial soap given and explained (verbal and written), with teach back.

## 2020-09-30 ENCOUNTER — APPOINTMENT (OUTPATIENT)
Dept: OBGYN | Facility: CLINIC | Age: 25
End: 2020-09-30

## 2020-10-21 NOTE — OB RN DELIVERY SUMMARY - NS_TRUEKNOTA_OBGYN_ALL_OB
Problem: Adult Inpatient Plan of Care  Goal: Plan of Care Review  10/21/2020 1527 by Leanna Rashid RN  Outcome: Ongoing, Progressing  Flowsheets (Taken 10/21/2020 1527)  Progress: improving  Plan of Care Reviewed With: patient  Outcome Summary: Patient transferred from PACU, VSS, on room air, patient will be discharged home later   Goal Outcome Evaluation:  Plan of Care Reviewed With: patient  Progress: improving  Outcome Summary: Patient transferred from PACU, VSS, on room air, patient will be discharged home later   None

## 2020-10-27 NOTE — OB RN PREOPERATIVE CHECKLIST - SIDE RAILS UP
[FreeTextEntry8] : Pt with complaint of reproducible pain of chest wall, notably on the left side. Pt also complaining of random pains in upper shoulders. These pains seem worse in periods of stress which pt has been under a lot of. No SOB, palpitations.  n/a

## 2020-12-03 ENCOUNTER — APPOINTMENT (OUTPATIENT)
Dept: OBGYN | Facility: CLINIC | Age: 25
End: 2020-12-03

## 2021-06-11 NOTE — PROGRESS NOTE ADULT - SUBJECTIVE AND OBJECTIVE BOX
2021    Jojo Nolasco (:  1936) is a 78 y.o. female, here for evaluation of the following medical concerns:  Chief Complaint   Patient presents with    Edema     Right elbow. She woke up this morning with it. Olecranon bursitis. On blood thinners. Already on antibiotics. Leg healing well, no sign of infection. Dressing changed. Allergies   Allergen Reactions    Codeine Nausea And Vomiting    Codimal-A [Brompheniramine] Other (See Comments)     Altered mental state and confusion    Lipitor Other (See Comments)     GENERALIZED ACHING, DIFFICULT TO FUNCTION    Spiriva Handihaler [Tiotropium Bromide Monohydrate]     Tudorza Pressair [Aclidinium Bromide]     Ciprofloxacin Nausea And Vomiting       Prior to Visit Medications    Medication Sig Taking? Authorizing Provider   cephALEXin (KEFLEX) 500 MG capsule Take 1 capsule by mouth 3 times daily for 10 days Yes Robi Vazquez DO   levothyroxine (SYNTHROID) 112 MCG tablet 100 mcg  Yes Historical Provider, MD   Levothyroxine Sodium 13 MCG CAPS TAKE 1 CAPSULE BY MOUTH EVERY DAY Yes Historical Provider, MD   dilTIAZem (CARDIZEM CD) 180 MG extended release capsule TAKE 1 CAPSULE BY MOUTH TWICE A DAY  Patient taking differently: Take 180 mg by mouth 2 times daily TAKE 1 CAPSULE BY MOUTH TWICE A DAY  TAKES 2 ONCE A DAY 6/10/21 Yes Bg Manriquez MD   XARELTO 20 MG TABS tablet TAKE 1 TABLET BY MOUTH EVERY DAY Yes Bg Manriquez MD   simvastatin (ZOCOR) 40 MG tablet Take 40 mg by mouth nightly  Yes Historical Provider, MD   citalopram (CELEXA) 20 MG tablet Take 20 mg by mouth daily Yes Historical Provider, MD   vitamin B-12 (CYANOCOBALAMIN) 1000 MCG tablet Take 6,000 mcg by mouth daily  Yes Historical Provider, MD   albuterol (PROVENTIL HFA) 108 (90 BASE) MCG/ACT inhaler Inhale 2 puffs into the lungs every 4 hours as needed for Wheezing or Shortness of Breath.  Yes Kati Ribeiro MD   Cholecalciferol (VITAMIN D3) 2000 units OB Progress Note: LTCS, POD#2    S: 25yo POD#2 s/p LTCS. Pain is well controlled. She is tolerating a regular diet and passing flatus. She is voiding spontaneously, and ambulating without difficulty. Denies CP/SOB. Denies lightheadedness/dizziness. Denies N/V.     O:  Vital Signs Last 24 Hrs  T(C): 36.8 (21 Dec 2019 05:58), Max: 37.2 (20 Dec 2019 10:09)  T(F): 98.3 (21 Dec 2019 05:58), Max: 99 (20 Dec 2019 10:09)  HR: 70 (21 Dec 2019 05:58) (70 - 84)  BP: 110/59 (21 Dec 2019 05:58) (110/57 - 114/69)  RR: 18 (21 Dec 2019 05:58) (18 - 19)  SpO2: 99% (21 Dec 2019 05:58) (99% - 100%)    Physical exam:  General: NAD  Abdomen: Soft, appropriately tender, incision c/d/i.  Extremities: No erythema, no pitting edema    MEDICATIONS  (STANDING):  acetaminophen   Tablet .. 975 milliGRAM(s) Oral <User Schedule>  ascorbic acid 500 milliGRAM(s) Oral daily  diphtheria/tetanus/pertussis (acellular) Vaccine (ADAcel) 0.5 milliLiter(s) IntraMuscular once  ferrous    sulfate 325 milliGRAM(s) Oral three times a day  heparin  Injectable 5000 Unit(s) SubCutaneous every 12 hours  ibuprofen  Tablet. 600 milliGRAM(s) Oral every 6 hours  ibuprofen  Tablet. 600 milliGRAM(s) Oral every 6 hours  measles/mumps/rubella Vaccine 0.5 milliLiter(s) SubCutaneous once      MEDICATIONS  (PRN):  diphenhydrAMINE 25 milliGRAM(s) Oral every 6 hours PRN Itching  glycerin Suppository - Adult 1 Suppository(s) Rectal at bedtime PRN Constipation  lanolin Ointment 1 Application(s) Topical every 6 hours PRN Sore Nipples  magnesium hydroxide Suspension 30 milliLiter(s) Oral two times a day PRN Constipation  oxyCODONE    IR 5 milliGRAM(s) Oral once PRN Moderate to Severe Pain (4-10)  oxyCODONE    IR 5 milliGRAM(s) Oral every 3 hours PRN Moderate to Severe Pain (4-10)  simethicone 80 milliGRAM(s) Chew every 4 hours PRN Gas      Labs:  Blood type: A Positive  Rubella IgG: RPR: Negative                          8.9<L>   19.08<H> >-----------< 291    ( 12-20 @ 06:42 )             28.5<L>                        10.5<L>   16.70<H> >-----------< 372    ( 12-19 @ 08:15 )             33.7<L> TABS Take 1 tablet by mouth daily  Yes Historical Provider, MD   Biotin 5000 MCG CAPS Take 1 capsule by mouth daily. Yes Historical Provider, MD   Vitamin E 400 UNIT TABS Take 1 tablet by mouth daily. Yes Historical Provider, MD   fluticasone-salmeterol (WIXELA INHUB) 250-50 MCG/DOSE AEPB Inhale 1 puff into the lungs every 12 hours  Flavia Norris MD   dronedarone hcl (MULTAQ) 400 MG TABS Take 1 tablet by mouth 2 times daily (with meals) for 1 day Lot: [id number]  Exp:   Michael Salcido MD        Immunization History   Administered Date(s) Administered    Tdap (Boostrix, Adacel) 06/10/2021        Past Surgical History:   Procedure Laterality Date    APPENDECTOMY  62 YEARS AGO    CARDIOVERSION  2019    Dr. Ferrell Primes 1 shock 200 joules Successful    CHOLECYSTECTOMY      EYE SURGERY      CATARACT RIGHT AND LEFT    HYSTERECTOMY      NECK SURGERY  2011    THYROIDECTOMY  17 YEARS AGO    TONSILLECTOMY         Social History     Socioeconomic History    Marital status:      Spouse name: Not on file    Number of children: Not on file    Years of education: Not on file    Highest education level: Not on file   Occupational History    Not on file   Tobacco Use    Smoking status: Former Smoker     Types: Cigarettes     Quit date: 1994     Years since quittin.7    Smokeless tobacco: Never Used   Vaping Use    Vaping Use: Never used   Substance and Sexual Activity    Alcohol use: No     Comment: RARE    Drug use: No    Sexual activity: Not on file   Other Topics Concern    Not on file   Social History Narrative    Not on file     Social Determinants of Health     Financial Resource Strain:     Difficulty of Paying Living Expenses:    Food Insecurity:     Worried About Running Out of Food in the Last Year:     [address] in the Last Year:    Transportation Needs:     Lack of Transportation (Medical):      Lack of Transportation (Non-Medical):    Physical Activity:     Days of Exercise per Week:     Minutes of Exercise per Session:    Stress:     Feeling of Stress :    Social Connections:     Frequency of Communication with Friends and Family:     Frequency of Social Gatherings with Friends and Family:     Attends Confucianism Services:     Active Member of Clubs or Organizations:     Attends Club or Organization Meetings:     Marital Status:    Intimate Partner Violence:     Fear of Current or Ex-Partner:     Emotionally Abused:     Physically Abused:     Sexually Abused:         No family history on file. Review of Systems    Vitals:    06/11/21 1251   BP: 104/70   Pulse: 115   Temp: 96.7 °F (35.9 °C)   SpO2: 96%   Weight: 193 lb 12.8 oz (87.9 kg)   Height: 5' 8\" (1.727 m)       Estimated body mass index is 29.47 kg/m² as calculated from the following:    Height as of this encounter: 5' 8\" (1.727 m). Weight as of this encounter: 193 lb 12.8 oz (87.9 kg). BP Readings from Last 3 Encounters:   06/11/21 104/70   06/10/21 124/72   03/30/21 134/72        Physical Exam    ASSESSMENT/PLAN:  There are no diagnoses linked to this encounter. No follow-ups on file. An  electronic signature was used to authenticate this note.     --Babatunde Marcus DO on 6/11/2021 at 1:10 PM

## 2022-06-30 NOTE — OB PROVIDER H&P - NS_GBS_INFANT_INVASIVE_OBGYN_ALL_OB_FT
Isotretinoin Counseling: Patient should get monthly blood tests, not donate blood, not drive at night if vision affected, not share medication, and not undergo elective surgery for 6 months after tx completed. Side effects reviewed, pt to contact office should one occur. Patient states no history

## 2022-11-16 NOTE — OB PROVIDER H&P - PROBLEM SELECTOR PLAN 1
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 Stressed the importance of strong glucose control. Advised the importance of at least yearly dilated examinations, but to contact us immediately for any problems or concerns. -Admit to L&D  -Cont efm/toco  -Obtain GBS records from [facility]  -Routine labs  -IVF/NPO  -social work consult for hx pp depression  -Consents signed by Dr. Madison

## 2023-03-15 NOTE — OB RN DELIVERY SUMMARY - NS_RESUSCITEFFORT_OBGYN_ALL_OB
Quality 226: Preventive Care And Screening: Tobacco Use: Screening And Cessation Intervention: Patient screened for tobacco use and is an ex/non-smoker Detail Level: Detailed Quality 130: Documentation Of Current Medications In The Medical Record: Current Medications Documented Quality 431: Preventive Care And Screening: Unhealthy Alcohol Use - Screening: Patient not identified as an unhealthy alcohol user when screened for unhealthy alcohol use using a systematic screening method Bulb [place]-Pharynx Suction Only
